# Patient Record
Sex: FEMALE | Race: WHITE | NOT HISPANIC OR LATINO
[De-identification: names, ages, dates, MRNs, and addresses within clinical notes are randomized per-mention and may not be internally consistent; named-entity substitution may affect disease eponyms.]

---

## 2018-02-07 ENCOUNTER — APPOINTMENT (OUTPATIENT)
Dept: ORTHOPEDIC SURGERY | Facility: CLINIC | Age: 53
End: 2018-02-07
Payer: COMMERCIAL

## 2018-02-07 PROCEDURE — 99214 OFFICE O/P EST MOD 30 MIN: CPT

## 2018-02-07 RX ORDER — DICLOFENAC SODIUM 75 MG/1
75 TABLET, DELAYED RELEASE ORAL
Qty: 90 | Refills: 1 | Status: ACTIVE | COMMUNITY
Start: 2018-02-07 | End: 1900-01-01

## 2018-02-20 ENCOUNTER — FORM ENCOUNTER (OUTPATIENT)
Age: 53
End: 2018-02-20

## 2018-02-21 ENCOUNTER — OUTPATIENT (OUTPATIENT)
Dept: OUTPATIENT SERVICES | Facility: HOSPITAL | Age: 53
LOS: 1 days | End: 2018-02-21
Payer: COMMERCIAL

## 2018-02-21 ENCOUNTER — APPOINTMENT (OUTPATIENT)
Dept: SPINE | Facility: CLINIC | Age: 53
End: 2018-02-21
Payer: COMMERCIAL

## 2018-02-21 VITALS
OXYGEN SATURATION: 99 % | WEIGHT: 122 LBS | HEIGHT: 64 IN | DIASTOLIC BLOOD PRESSURE: 70 MMHG | BODY MASS INDEX: 20.83 KG/M2 | SYSTOLIC BLOOD PRESSURE: 110 MMHG | HEART RATE: 84 BPM

## 2018-02-21 DIAGNOSIS — Z87.898 PERSONAL HISTORY OF OTHER SPECIFIED CONDITIONS: ICD-10-CM

## 2018-02-21 DIAGNOSIS — M54.2 CERVICALGIA: ICD-10-CM

## 2018-02-21 DIAGNOSIS — Z82.49 FAMILY HISTORY OF ISCHEMIC HEART DISEASE AND OTHER DISEASES OF THE CIRCULATORY SYSTEM: ICD-10-CM

## 2018-02-21 DIAGNOSIS — Z80.42 FAMILY HISTORY OF MALIGNANT NEOPLASM OF PROSTATE: ICD-10-CM

## 2018-02-21 DIAGNOSIS — J38.1 POLYP OF VOCAL CORD AND LARYNX: ICD-10-CM

## 2018-02-21 PROCEDURE — 72050 X-RAY EXAM NECK SPINE 4/5VWS: CPT

## 2018-02-21 PROCEDURE — 72050 X-RAY EXAM NECK SPINE 4/5VWS: CPT | Mod: 26

## 2018-02-21 PROCEDURE — 99244 OFF/OP CNSLTJ NEW/EST MOD 40: CPT

## 2018-02-23 PROBLEM — Z80.42 FAMILY HISTORY OF MALIGNANT NEOPLASM OF PROSTATE: Status: ACTIVE | Noted: 2018-02-21

## 2018-02-23 PROBLEM — J38.1 POLYP, VOCAL CORD: Status: RESOLVED | Noted: 2018-02-23 | Resolved: 2018-02-23

## 2018-02-23 PROBLEM — Z87.898 HISTORY OF DIZZINESS: Status: RESOLVED | Noted: 2018-02-23 | Resolved: 2018-02-23

## 2018-02-23 PROBLEM — Z82.49 FAMILY HISTORY OF HYPERTENSION: Status: ACTIVE | Noted: 2018-02-21

## 2018-02-23 RX ORDER — IBUPROFEN 800 MG
TABLET ORAL
Refills: 0 | Status: ACTIVE | COMMUNITY

## 2018-02-23 RX ORDER — ACETAMINOPHEN 325 MG/1
TABLET, FILM COATED ORAL
Refills: 0 | Status: ACTIVE | COMMUNITY

## 2018-02-23 RX ORDER — BIOTIN 5 MG
5000 CAPSULE ORAL
Refills: 0 | Status: ACTIVE | COMMUNITY

## 2018-02-23 RX ORDER — NAPROXEN SODIUM 220 MG
TABLET ORAL
Refills: 0 | Status: ACTIVE | COMMUNITY

## 2018-03-02 ENCOUNTER — APPOINTMENT (OUTPATIENT)
Dept: ORTHOPEDIC SURGERY | Facility: CLINIC | Age: 53
End: 2018-03-02

## 2018-03-14 ENCOUNTER — APPOINTMENT (OUTPATIENT)
Dept: SPINE | Facility: CLINIC | Age: 53
End: 2018-03-14
Payer: COMMERCIAL

## 2018-03-14 VITALS
OXYGEN SATURATION: 100 % | DIASTOLIC BLOOD PRESSURE: 69 MMHG | WEIGHT: 125 LBS | HEIGHT: 64 IN | SYSTOLIC BLOOD PRESSURE: 101 MMHG | BODY MASS INDEX: 21.34 KG/M2 | HEART RATE: 93 BPM

## 2018-03-14 DIAGNOSIS — M54.12 RADICULOPATHY, CERVICAL REGION: ICD-10-CM

## 2018-03-14 DIAGNOSIS — M25.78 OSTEOPHYTE, VERTEBRAE: ICD-10-CM

## 2018-03-14 DIAGNOSIS — M43.10 SPONDYLOLISTHESIS, SITE UNSPECIFIED: ICD-10-CM

## 2018-03-14 DIAGNOSIS — M50.20 OTHER CERVICAL DISC DISPLACEMENT, UNSPECIFIED CERVICAL REGION: ICD-10-CM

## 2018-03-14 DIAGNOSIS — M99.81 OTHER BIOMECHANICAL LESIONS OF CERVICAL REGION: ICD-10-CM

## 2018-03-14 PROCEDURE — 99214 OFFICE O/P EST MOD 30 MIN: CPT

## 2023-01-08 ENCOUNTER — EMERGENCY (EMERGENCY)
Facility: HOSPITAL | Age: 58
LOS: 1 days | Discharge: HOME | End: 2023-01-08
Attending: EMERGENCY MEDICINE | Admitting: EMERGENCY MEDICINE
Payer: COMMERCIAL

## 2023-01-08 VITALS
DIASTOLIC BLOOD PRESSURE: 82 MMHG | HEART RATE: 93 BPM | WEIGHT: 115.08 LBS | HEIGHT: 63 IN | SYSTOLIC BLOOD PRESSURE: 136 MMHG | TEMPERATURE: 98 F | RESPIRATION RATE: 18 BRPM

## 2023-01-08 VITALS
DIASTOLIC BLOOD PRESSURE: 84 MMHG | OXYGEN SATURATION: 98 % | TEMPERATURE: 99 F | HEART RATE: 758 BPM | SYSTOLIC BLOOD PRESSURE: 129 MMHG | RESPIRATION RATE: 18 BRPM

## 2023-01-08 PROCEDURE — 99284 EMERGENCY DEPT VISIT MOD MDM: CPT

## 2023-01-08 RX ORDER — CIPROFLOXACIN HCL 0.3 %
1 DROPS OPHTHALMIC (EYE) ONCE
Refills: 0 | Status: COMPLETED | OUTPATIENT
Start: 2023-01-08 | End: 2023-01-08

## 2023-01-08 RX ORDER — FLUORESCEIN SODIUM 9 MG
1 STRIP OPHTHALMIC (EYE) ONCE
Refills: 0 | Status: COMPLETED | OUTPATIENT
Start: 2023-01-08 | End: 2023-01-08

## 2023-01-08 RX ADMIN — Medication 1 APPLICATION(S): at 20:08

## 2023-01-08 RX ADMIN — Medication 1 DROP(S): at 21:57

## 2023-01-08 RX ADMIN — Medication 1 DROP(S): at 20:08

## 2023-01-08 NOTE — ED PROVIDER NOTE - MUSCULOSKELETAL, MLM
Statement Selected Spine appears normal, range of motion is not limited, no muscle or joint tenderness

## 2023-01-08 NOTE — ED PROVIDER NOTE - CLINICAL SUMMARY MEDICAL DECISION MAKING FREE TEXT BOX
58 yo female present to ED c/o light sensitivity that began this morning. Worsening throughout the day, now unable to open eyes. With associated stabbing pain, burning, L nares dripping.   Denies HA, N/V. Denies trauma. Denies change in vision or blurred vision.  Patient does wear contact lenses but states she is not wearing them recently except for for a few hours yesterday and she took an hour prior to going to bed.  Patient's slit-lamp exam with punctate area of fluorescein uptake which is questionable small corneal abrasion differential diagnosis in this patient includes corneal abrasion versus uveitis which we are unable to visualize as there is nowhere dark enough to do adequate exam versus glaucoma.  Patient with normal vision normal visual fields and intraocular pressure normal excluding glaucoma likely.  Case was discussed by the PA with the on-call ophthalmologist feels this is most consistent with a case of uveitis.  However they do not want the patient treated with anything stronger than antibiotic drops for the questionable corneal abrasion and they will see the patient in ophthalmology clinic tomorrow or she can see an ophthalmologist affiliated in [facility]

## 2023-01-08 NOTE — ED PROVIDER NOTE - NS ED ATTENDING STATEMENT MOD
This was a shared visit with the ANGELO. I reviewed and verified the documentation and independently performed the documented:

## 2023-01-08 NOTE — ED PROVIDER NOTE - NSFOLLOWUPINSTRUCTIONS_ED_ALL_ED_FT
Uveitis    Eye anatomy showing choroid, ciliary body, iris, vitreous, and retina.   Uveitis is one cause of inflammation of the eye. It often affects the middle part of the eye (uvea). This area contains many of the blood vessels that supply the rest of the eye. The uvea is made up of three structures:  •The middle layer of the eyeball (choroid).      •The colored part of the front of the eye (iris).      •The connection between the iris and the choroid (ciliary body).      Uveitis can affect any part of the uvea, as well as other important structures in the eye. There are many types of uveitis:  •Iritis, or anterior uveitis, affects the iris. This is the most common type. It can start suddenly and can last for many weeks.      •Intermediate uveitis affects the structures in the middle of the eye. This includes the fluid that fills the eye (vitreous). This type can last for years and can come and go.      •Posterior uveitis affects the structures in the back of the eye. This includes the light-sensitive layer of cells that is needed for vision (retina). This is the least common type.      •Panuveitis affects all layers of the eye.      Uveitis can affect one eye or both eyes. It can cause short-term or long-term symptoms. Symptoms may go away and come back. Over time, the condition can damage or destroy eye structures and may lead to temporary or permanent vision loss.      What are the causes?    This condition may be caused by:  •Autoimmune diseases. These are diseases in which the body's defense system (immune system) mistakenly attacks the body's own tissues.      •Infections that start in the eye or spread to the eye.      •Inflammatory diseases that can affect the eye.      •Eye injuries.      •Eye surgery.      You may have medical tests, including blood tests, to help determine the cause of your uveitis. In some cases, the cause may not be known.      What increases the risk?    The following factors may make you more likely to develop this condition:  •Being 20–60 years old.      •Using tobacco products, such as cigarettes.      •Taking certain medicines.      •Having certain medical conditions, including autoimmune conditions, sarcoidosis, tuberculosis, and syphilis.      •Having certain genes passed to you from a parent (inherited).        What are the signs or symptoms?    Symptoms of this condition depend on the type of uveitis. Common symptoms include:  •Eye redness.      •Eye pain.      •Blurred vision.      •Decreased vision.      •Having a blind spot in your vision.      •Floating dark spots in your vision (floaters).      •Seeing flashing lights (photopsia).      •Sensitivity to light (photophobia).      •A white section on the lower part of the iris (hypopyon).        How is this diagnosed?    This condition may be diagnosed based on:  •A vision test using eye charts.      •A complete eye exam. This type of eye exam usually involves eye drops to dilate your pupils to get a view into the back of the eye. If the back of the eye cannot be seen, an ultrasound exam may be done.      •A test to measure eye pressure.        How is this treated?    Treatment for this condition will depend on the type of uveitis that you have. It should be started right away to help prevent vision loss. Treatment may include:  •Medicines to treat inflammation (steroids). You may get steroids as eye drops, injections into the eye, or by mouth (orally).      •Medicines to treat infection. These may be given as eye drops, injections into the eye, or orally.      •Eye drops to dilate the pupil and reduce pressure inside the eye.      •In some cases, medicines may be given through an IV or through a device that is implanted inside the eye.      •Other medicines may be needed depending on the cause of your uveitis.      •In rare cases, surgery may be needed (vitrectomy).        Follow these instructions at home:  A do not smoke cigarettes sign.   •Take over-the-counter and prescription medicines only as told by your health care provider.      •Follow instructions for safely putting eye drops in your eyes.      •Drink enough fluid to keep your urine pale yellow.      •Follow instructions from your health care provider about any restrictions on your activities. Ask what activities are safe for you.      • Do not use any products that contain nicotine or tobacco. These products include cigarettes, chewing tobacco, and vaping devices, such as e-cigarettes. If you need help quitting, ask your health care provider.      •Keep all follow-up visits. This is important.        Contact a health care provider if:    •Your symptoms do not improve as expected.        Get help right away if:    •You have vision loss in either eye.      •You have more redness in one or both eyes.      •Your eyes become more sensitive to light.      •You have more pain or aching in either eye.        Summary    •Uveitis is inflammation of the eye. It often affects the middle part of the eye (uvea). This area contains many of the blood vessels that supply the rest of the eye.      •Uveitis can affect one eye or both eyes. It can cause short-term or long-term symptoms. Symptoms may go away and come back.      •Treatment for this condition will depend on the type of uveitis that you have. Treatment should be started right away to help prevent vision loss.      •Over time, the condition can damage or destroy eye structures and may lead to temporary or permanent vision loss.      This information is not intended to replace advice given to you by your health care provider. Make sure you discuss any questions you have with your health care provider.      Document Revised: 02/16/2022 Document Reviewed: 02/16/2022    Elsevier Patient Education © 2022 [employer]

## 2023-01-08 NOTE — ED ADULT TRIAGE NOTE - CHIEF COMPLAINT QUOTE
patient ambulatory to triage a&ox4 with complaints of left eye pain. states she woke up today and both eyes were sensitive to light and as day progressed became worse and now with throbbing pain. denies any neurological complaints. speaking in full sentences. speech clear and logical. no unilateral weakness. no facial droop/drift noted.

## 2023-01-08 NOTE — ED PEDIATRIC NURSE REASSESSMENT NOTE - NS ED NURSE REASSESS COMMENT FT2
Patient presented into the ED with photosensitivity since today. She describes the pain as a sharp stabbing pain to the left eye. Since being seen and treated in the Ed patient now rates her pain a / on adult pain scale. Vitals are WNL. D/C instructions were given to patient who verbalized her understanding. Patient received ordered d/c meds and was seen leaving aaox4, ambulatory with steady gait and belongings.

## 2023-01-08 NOTE — ED ADULT NURSE NOTE - OBJECTIVE STATEMENT
Patient received from Triage. patient A&Ox4. patient complaining of B/L light sensitivity. L eye pain. "it feels like a stabbing and sharp pain." no complaints of head aches, dizziness, or blurry vision at this time. L eye noted to have redness. patient stated she tried eye drops from [employer] but there was no relief. patient was unable to drive or watch tv. patient stated it started this morning with light sensitivity  and got progressily worse as the day went on. Patient received from Triage. patient A&Ox4. patient complaining of B/L light sensitivity. L eye pain. "it feels like a stabbing and sharp pain." no complaints of head aches, dizziness, or blurry vision at this time. L eye noted to have redness. patient stated she tried eye drops from [employer] but there was no relief. patient was unable to drive or watch tv. patient stated it started this morning with light sensitivity  and got progressively worse as the day went on.

## 2023-01-08 NOTE — ED PEDIATRIC NURSE REASSESSMENT NOTE - NS ED NURSE REASSESS COMMENT FT2
Patient reiceved ordered meds to take home and instructed on how to use drops. Patient seen leaving with son aaox4, ambulatory with a steady gait.

## 2023-01-08 NOTE — ED PROVIDER NOTE - PATIENT PORTAL LINK FT
You can access the FollowMyHealth Patient Portal offered by [facility] by registering at the following website: [email or web address]. By joining [facility]’s FollowMyHealth portal, you will also be able to view your health information using other applications (apps) compatible with our system.

## 2023-01-08 NOTE — ED PROVIDER NOTE - OBJECTIVE STATEMENT
56 yo female present to ED c/o light sensitivity that began this morning. Worsening throughout the day, now unable to open eyes. With associated stabbing pain, burning, L nares dripping.   Denies HA, N/V. Denies trauma. Denies change in vision or blurred vision.

## 2023-01-09 ENCOUNTER — NON-APPOINTMENT (OUTPATIENT)
Age: 58
End: 2023-01-09

## 2023-01-09 ENCOUNTER — APPOINTMENT (OUTPATIENT)
Dept: OPHTHALMOLOGY | Facility: CLINIC | Age: 58
End: 2023-01-09
Payer: COMMERCIAL

## 2023-01-09 PROCEDURE — 92002 INTRM OPH EXAM NEW PATIENT: CPT

## 2023-01-11 ENCOUNTER — APPOINTMENT (OUTPATIENT)
Dept: OPHTHALMOLOGY | Facility: CLINIC | Age: 58
End: 2023-01-11
Payer: COMMERCIAL

## 2023-01-11 ENCOUNTER — NON-APPOINTMENT (OUTPATIENT)
Age: 58
End: 2023-01-11

## 2023-01-11 PROCEDURE — 92012 INTRM OPH EXAM EST PATIENT: CPT

## 2023-01-11 PROCEDURE — 92285 EXTERNAL OCULAR PHOTOGRAPHY: CPT

## 2023-01-13 ENCOUNTER — NON-APPOINTMENT (OUTPATIENT)
Age: 58
End: 2023-01-13

## 2023-01-13 ENCOUNTER — APPOINTMENT (OUTPATIENT)
Dept: OPHTHALMOLOGY | Facility: CLINIC | Age: 58
End: 2023-01-13
Payer: COMMERCIAL

## 2023-01-13 PROCEDURE — 92012 INTRM OPH EXAM EST PATIENT: CPT

## 2023-01-23 ENCOUNTER — NON-APPOINTMENT (OUTPATIENT)
Age: 58
End: 2023-01-23

## 2023-01-23 ENCOUNTER — APPOINTMENT (OUTPATIENT)
Dept: OPHTHALMOLOGY | Facility: CLINIC | Age: 58
End: 2023-01-23
Payer: COMMERCIAL

## 2023-01-23 PROCEDURE — 92012 INTRM OPH EXAM EST PATIENT: CPT

## 2023-06-29 NOTE — ED ADULT NURSE NOTE - NSIMPLEMENTINTERV_GEN_ALL_ED
No
Implemented All Universal Safety Interventions:  [place] to call system. Call bell, personal items and telephone within reach. Instruct patient to call for assistance. Room bathroom lighting operational. Non-slip footwear when patient is off stretcher. Physically safe environment: no spills, clutter or unnecessary equipment. Stretcher in lowest position, wheels locked, appropriate side rails in place.

## 2024-07-11 ENCOUNTER — APPOINTMENT (OUTPATIENT)
Dept: OPHTHALMOLOGY | Facility: CLINIC | Age: 59
End: 2024-07-11
Payer: COMMERCIAL

## 2024-07-11 ENCOUNTER — NON-APPOINTMENT (OUTPATIENT)
Age: 59
End: 2024-07-11

## 2024-07-11 PROCEDURE — 92015 DETERMINE REFRACTIVE STATE: CPT

## 2024-07-11 PROCEDURE — 92012 INTRM OPH EXAM EST PATIENT: CPT

## 2024-08-08 ENCOUNTER — APPOINTMENT (OUTPATIENT)
Dept: OPHTHALMOLOGY | Facility: CLINIC | Age: 59
End: 2024-08-08

## 2024-08-08 ENCOUNTER — NON-APPOINTMENT (OUTPATIENT)
Age: 59
End: 2024-08-08

## 2024-08-08 PROCEDURE — 92012 INTRM OPH EXAM EST PATIENT: CPT

## 2024-08-12 ENCOUNTER — APPOINTMENT (OUTPATIENT)
Dept: OPHTHALMOLOGY | Facility: CLINIC | Age: 59
End: 2024-08-12

## 2024-08-14 ENCOUNTER — APPOINTMENT (OUTPATIENT)
Dept: OPHTHALMOLOGY | Facility: CLINIC | Age: 59
End: 2024-08-14
Payer: COMMERCIAL

## 2024-08-14 ENCOUNTER — NON-APPOINTMENT (OUTPATIENT)
Age: 59
End: 2024-08-14

## 2024-08-14 PROCEDURE — 92002 INTRM OPH EXAM NEW PATIENT: CPT

## 2024-08-21 ENCOUNTER — NON-APPOINTMENT (OUTPATIENT)
Age: 59
End: 2024-08-21

## 2024-08-21 ENCOUNTER — APPOINTMENT (OUTPATIENT)
Dept: OPHTHALMOLOGY | Facility: CLINIC | Age: 59
End: 2024-08-21
Payer: COMMERCIAL

## 2024-08-21 PROCEDURE — 92012 INTRM OPH EXAM EST PATIENT: CPT
